# Patient Record
Sex: FEMALE | Race: BLACK OR AFRICAN AMERICAN | NOT HISPANIC OR LATINO | URBAN - METROPOLITAN AREA
[De-identification: names, ages, dates, MRNs, and addresses within clinical notes are randomized per-mention and may not be internally consistent; named-entity substitution may affect disease eponyms.]

---

## 2017-02-22 ENCOUNTER — EMERGENCY (EMERGENCY)
Age: 27
LOS: 1 days | Discharge: HOME | End: 2017-02-22
Attending: EMERGENCY MEDICINE | Admitting: EMERGENCY MEDICINE
Payer: MEDICAID

## 2017-02-22 VITALS
TEMPERATURE: 99 F | WEIGHT: 230.38 LBS | HEART RATE: 91 BPM | OXYGEN SATURATION: 99 % | DIASTOLIC BLOOD PRESSURE: 90 MMHG | SYSTOLIC BLOOD PRESSURE: 175 MMHG | RESPIRATION RATE: 18 BRPM

## 2017-02-22 VITALS
SYSTOLIC BLOOD PRESSURE: 137 MMHG | TEMPERATURE: 99 F | WEIGHT: 293 LBS | RESPIRATION RATE: 16 BRPM | OXYGEN SATURATION: 100 % | DIASTOLIC BLOOD PRESSURE: 76 MMHG | HEART RATE: 96 BPM

## 2017-02-22 PROCEDURE — 99284 EMERGENCY DEPT VISIT MOD MDM: CPT | Mod: 25

## 2017-02-22 PROCEDURE — 10060 I&D ABSCESS SIMPLE/SINGLE: CPT

## 2017-02-22 RX ORDER — AZTREONAM 2 G
1 VIAL (EA) INJECTION
Qty: 10 | Refills: 0 | OUTPATIENT
Start: 2017-02-22 | End: 2017-02-27

## 2017-02-22 NOTE — ED PROVIDER NOTE - MEDICAL DECISION MAKING DETAILS
27 y/o F pt presents to the ED for abscess on right buttock x2-3 days. Plan: I&D. 27 y/o F pt presents to the ED for abscess on right buttock x2-3 days.  No e/o perirectal abscess.  Drained without incident.  Patient informed of ED visit findings, understands plan.  Patient provided with written and further verbal instructions not included in discharge paperwork.  Patient instructed to follow up with their primary care physician in 2-3 days and return for new, worsened, or persistent symptoms.

## 2017-02-22 NOTE — ED PROVIDER NOTE - NS ED MD SCRIBE ATTENDING SCRIBE SECTIONS
PAST MEDICAL/SURGICAL/SOCIAL HISTORY/DISPOSITION/HIV/REVIEW OF SYSTEMS/VITAL SIGNS( Pullset)/HISTORY OF PRESENT ILLNESS

## 2017-02-22 NOTE — ED PROVIDER NOTE - PHYSICAL EXAMINATION
3cm induration, fluctuance at gluteal cleft, right buttocks, findings past anal verge or involving anus.

## 2017-02-22 NOTE — ED PROVIDER NOTE - OBJECTIVE STATEMENT
27 y/o F pt with no significant PMHx presents to the ED for abscess on right buttock and associated pain x2-3 days. States hx of cyst that resolve spontaneously. Denies fever or purulent drainage.

## 2017-02-22 NOTE — ED PROCEDURE NOTE - PROCEDURE ADDITIONAL DETAILS
Cleansed with betadine.  Innoculations disrupted. Cleansed with betadine.  Innoculations disrupted.  I performed a face to face evaluation of this patient and reviewed the scribe's note

## 2017-11-03 ENCOUNTER — EMERGENCY (EMERGENCY)
Facility: HOSPITAL | Age: 27
LOS: 0 days | Discharge: HOME | End: 2017-11-03
Attending: EMERGENCY MEDICINE
Payer: MEDICAID

## 2017-11-03 ENCOUNTER — EMERGENCY (EMERGENCY)
Facility: HOSPITAL | Age: 27
LOS: 1 days | Discharge: HOME | End: 2017-11-03
Admitting: EMERGENCY MEDICINE
Payer: MEDICAID

## 2017-11-03 VITALS
WEIGHT: 259.93 LBS | RESPIRATION RATE: 17 BRPM | SYSTOLIC BLOOD PRESSURE: 143 MMHG | DIASTOLIC BLOOD PRESSURE: 86 MMHG | OXYGEN SATURATION: 100 % | HEIGHT: 70 IN | TEMPERATURE: 100 F | HEART RATE: 104 BPM

## 2017-11-03 VITALS
RESPIRATION RATE: 17 BRPM | TEMPERATURE: 99 F | SYSTOLIC BLOOD PRESSURE: 125 MMHG | HEART RATE: 92 BPM | OXYGEN SATURATION: 99 % | DIASTOLIC BLOOD PRESSURE: 65 MMHG

## 2017-11-03 PROCEDURE — 10060 I&D ABSCESS SIMPLE/SINGLE: CPT

## 2017-11-03 PROCEDURE — 99283 EMERGENCY DEPT VISIT LOW MDM: CPT

## 2017-11-03 PROCEDURE — 99284 EMERGENCY DEPT VISIT MOD MDM: CPT | Mod: 25

## 2017-11-03 RX ORDER — IBUPROFEN 200 MG
600 TABLET ORAL ONCE
Qty: 0 | Refills: 0 | Status: COMPLETED | OUTPATIENT
Start: 2017-11-03 | End: 2017-11-03

## 2017-11-03 RX ADMIN — Medication 600 MILLIGRAM(S): at 21:34

## 2017-11-03 RX ADMIN — Medication 300 MILLIGRAM(S): at 21:13

## 2017-11-03 RX ADMIN — Medication 600 MILLIGRAM(S): at 21:13

## 2017-11-03 NOTE — ED PROVIDER NOTE - CARE PLAN
Principal Discharge DX:	Axillary abscess Principal Discharge DX:	Axillary abscess  Instructions for follow-up, activity and diet:	Avoid Strenuous activity.   Return to ER in 2 days for packing change.   Apply warm compress.   Keep wound clean and dry for 24 hours. after 24 hours, wash with soap and water.   Take OTC tylenol or Ibuprofen for pain as needed.   Return to ER for worsening of symptom, increased pain, swelling, redness or drainage.

## 2017-11-03 NOTE — ED PROVIDER NOTE - CONSTITUTIONAL, MLM
normal... Obese, Well appearing, well nourished, awake, alert, oriented to person, place, time/situation and in no apparent distress.

## 2017-11-03 NOTE — ED PROVIDER NOTE - MEDICAL DECISION MAKING DETAILS
26 yo Obese F, with no significant PMH presenting with painful right axilla abscess x3 days with worsening pain.  -I&D, finger stick 26 yo Obese F, with no significant PMH presenting with painful right axilla abscess x3 days with worsening pain.  -I&D, finger stick, wound care

## 2017-11-03 NOTE — ED PROVIDER NOTE - OBJECTIVE STATEMENT
28 yo F, with no significant PMH presents to ER c/o painful right underarm cyst x 3 days. Pt states pain is sharp, constant, 10/10, no radiation. States she had similar cyst in the past twice which ruptured spontaneously. Reports using OTC antibiotic cream for boil made it worse. admits using deodorant and shaving of underarm. denies any drainage or taking any pain medication. denies any fever, chills, n/v, chest pain, sob, or any other complaints.

## 2017-11-03 NOTE — ED PROVIDER NOTE - PHYSICAL EXAMINATION
Right axilla: there is a 4cm x 2.5 cm edematous, fluctuant, tender abscess with hypertropic scar, Skin intact, no drainage noted. Right axilla: there is a 4cm x 2.5 cm edematous, fluctuant, indurated- tender abscess with hypertropic scar, Skin intact, no drainage noted.

## 2017-11-03 NOTE — ED PROVIDER NOTE - MEDICAL DECISION MAKING DETAILS
abscess not drainable at this time, will proceed with oral abx and watchful waiting, patient aware of signs/symptoms to return to ED

## 2017-11-03 NOTE — ED ADULT TRIAGE NOTE - CHIEF COMPLAINT QUOTE
pt. came in c/o "cyst" on her rt underarm x 3 days. c/o pain on underarm, denies any drainage nor fever.

## 2017-11-03 NOTE — ED PROVIDER NOTE - PLAN OF CARE
Avoid Strenuous activity.   Return to ER in 2 days for packing change.   Apply warm compress.   Keep wound clean and dry for 24 hours. after 24 hours, wash with soap and water.   Take OTC tylenol or Ibuprofen for pain as needed.   Return to ER for worsening of symptom, increased pain, swelling, redness or drainage.

## 2017-11-03 NOTE — ED PROVIDER NOTE - PROGRESS NOTE DETAILS
I and D performed. Pt tolerated. Pt to follow up in 2 days for wound check. Pt agrees with plan. MARLEY Montemayor: I have personally seen and examined this patient. I have reviewed and addended the HPI, ROS, PE, and A/P as necessary. I agree with the above plan of care.

## 2017-11-04 RX ORDER — IBUPROFEN 200 MG
600 TABLET ORAL ONCE
Qty: 0 | Refills: 0 | Status: COMPLETED | OUTPATIENT
Start: 2017-11-04 | End: 2017-11-04

## 2017-11-04 RX ADMIN — Medication 600 MILLIGRAM(S): at 01:00

## 2017-11-05 LAB — SPECIMEN SOURCE: SIGNIFICANT CHANGE UP

## 2017-11-06 DIAGNOSIS — L02.411 CUTANEOUS ABSCESS OF RIGHT AXILLA: ICD-10-CM

## 2017-11-06 LAB
METHOD TYPE: SIGNIFICANT CHANGE UP
ORGANISM # SPEC MICROSCOPIC CNT: SIGNIFICANT CHANGE UP

## 2017-11-06 NOTE — ED POST DISCHARGE NOTE - RESULT SUMMARY
WCX: Staphylococcus aureus.  Pt discharged on Clindamycin 300mg TID x 7 days.  Results prelim.  Follow results to final.

## 2017-11-07 LAB
-  CEFAZOLIN: SIGNIFICANT CHANGE UP
-  CEFAZOLIN: SIGNIFICANT CHANGE UP
-  CIPROFLOXACIN: SIGNIFICANT CHANGE UP
-  CIPROFLOXACIN: SIGNIFICANT CHANGE UP
-  CLINDAMYCIN: SIGNIFICANT CHANGE UP
-  CLINDAMYCIN: SIGNIFICANT CHANGE UP
-  ERYTHROMYCIN: SIGNIFICANT CHANGE UP
-  ERYTHROMYCIN: SIGNIFICANT CHANGE UP
-  GENTAMICIN: SIGNIFICANT CHANGE UP
-  GENTAMICIN: SIGNIFICANT CHANGE UP
-  MOXIFLOXACIN(AEROBIC): SIGNIFICANT CHANGE UP
-  MOXIFLOXACIN(AEROBIC): SIGNIFICANT CHANGE UP
-  OXACILLIN: SIGNIFICANT CHANGE UP
-  OXACILLIN: SIGNIFICANT CHANGE UP
-  PENICILLIN: SIGNIFICANT CHANGE UP
-  RIFAMPIN.: SIGNIFICANT CHANGE UP
-  RIFAMPIN.: SIGNIFICANT CHANGE UP
-  TETRACYCLINE: SIGNIFICANT CHANGE UP
-  TETRACYCLINE: SIGNIFICANT CHANGE UP
-  TRIMETHOPRIM/SULFAMETHOXAZOLE: SIGNIFICANT CHANGE UP
-  TRIMETHOPRIM/SULFAMETHOXAZOLE: SIGNIFICANT CHANGE UP
-  VANCOMYCIN: SIGNIFICANT CHANGE UP
-  VANCOMYCIN: SIGNIFICANT CHANGE UP
BACTERIA WND CULT: SIGNIFICANT CHANGE UP
METHOD TYPE: SIGNIFICANT CHANGE UP

## 2017-11-08 ENCOUNTER — EMERGENCY (EMERGENCY)
Facility: HOSPITAL | Age: 27
LOS: 1 days | Discharge: HOME | End: 2017-11-08
Attending: EMERGENCY MEDICINE | Admitting: EMERGENCY MEDICINE

## 2017-11-08 VITALS
RESPIRATION RATE: 18 BRPM | HEART RATE: 80 BPM | DIASTOLIC BLOOD PRESSURE: 77 MMHG | TEMPERATURE: 99 F | SYSTOLIC BLOOD PRESSURE: 147 MMHG | OXYGEN SATURATION: 100 %

## 2017-11-08 NOTE — ED PROVIDER NOTE - OBJECTIVE STATEMENT
26 y/o F w/ no significant PMHx, presents to the ED for wound check s/p I&D to right axilla. Pt was seen in ED on 11/04 for right axilla cyst and had I&D performed. Denies fever, chills or any other complaints. NKDA.

## 2018-07-05 NOTE — ED PROVIDER NOTE - NS ED MD DISPO DISCHARGE
Writer faxed the sodium order to the number Gay provided.  In the message Gay stated that they were going to the clinic on Monday.  Writer returned the call and let her know that the lab needed to be done tomorrow.  Gay thought the pt needed a MD appointment to have the blood drawn.  Writer told Gay that only a lab appointment.  It is unclear why this cannot be done at the clinic tomorrow.  (Maybe the clinic does not offer this service.)  Gay thought that maybe they would be able to go to the ED at [facility].  Gay thinks she can go to the ED and ask for the sodium level to be drawn.  Writer she would fax the order any where it needed to go.  Also, the writer needs to know where the patient plans to go so the writer can follow up.   Home

## 2019-01-10 NOTE — ED ADULT TRIAGE NOTE - WEIGHT IN LBS
259.9 Subjective:       Patient ID: Tawanna Dukes 